# Patient Record
Sex: FEMALE | Race: WHITE | Employment: FULL TIME | URBAN - METROPOLITAN AREA
[De-identification: names, ages, dates, MRNs, and addresses within clinical notes are randomized per-mention and may not be internally consistent; named-entity substitution may affect disease eponyms.]

---

## 2017-06-07 ENCOUNTER — HOSPITAL ENCOUNTER (OUTPATIENT)
Dept: CT IMAGING | Age: 57
Discharge: HOME | End: 2017-06-07
Attending: INTERNAL MEDICINE
Payer: COMMERCIAL

## 2017-06-07 DIAGNOSIS — R91.1 SOLITARY PULMONARY NODULE: ICD-10-CM

## 2017-06-07 PROCEDURE — 71250 CT THORAX DX C-: CPT

## 2019-12-20 ENCOUNTER — CLINICAL SUPPORT (OUTPATIENT)
Dept: CARDIOLOGY CLINIC | Age: 59
End: 2019-12-20

## 2019-12-20 DIAGNOSIS — R00.2 PALPITATIONS: Primary | ICD-10-CM

## 2020-01-23 ENCOUNTER — OFFICE VISIT (OUTPATIENT)
Dept: CARDIOLOGY CLINIC | Age: 60
End: 2020-01-23

## 2020-01-23 VITALS
SYSTOLIC BLOOD PRESSURE: 124 MMHG | WEIGHT: 151.8 LBS | DIASTOLIC BLOOD PRESSURE: 60 MMHG | BODY MASS INDEX: 27.94 KG/M2 | HEIGHT: 62 IN | OXYGEN SATURATION: 98 % | RESPIRATION RATE: 16 BRPM | HEART RATE: 87 BPM

## 2020-01-23 DIAGNOSIS — I07.1 TRICUSPID VALVE INSUFFICIENCY, UNSPECIFIED ETIOLOGY: ICD-10-CM

## 2020-01-23 DIAGNOSIS — R06.09 DOE (DYSPNEA ON EXERTION): ICD-10-CM

## 2020-01-23 DIAGNOSIS — R53.83 FATIGUE, UNSPECIFIED TYPE: ICD-10-CM

## 2020-01-23 DIAGNOSIS — I49.9 CARDIAC ARRHYTHMIA, UNSPECIFIED CARDIAC ARRHYTHMIA TYPE: ICD-10-CM

## 2020-01-23 DIAGNOSIS — R00.2 PALPITATIONS: Primary | ICD-10-CM

## 2020-01-23 DIAGNOSIS — R60.9 SWELLING: ICD-10-CM

## 2020-01-23 DIAGNOSIS — E78.5 DYSLIPIDEMIA: ICD-10-CM

## 2020-01-23 DIAGNOSIS — E11.59 TYPE 2 DIABETES MELLITUS WITH OTHER CIRCULATORY COMPLICATION, WITHOUT LONG-TERM CURRENT USE OF INSULIN (HCC): ICD-10-CM

## 2020-01-23 RX ORDER — ZINC GLUCONATE 10 MG
1 LOZENGE ORAL DAILY
COMMUNITY

## 2020-01-23 RX ORDER — METRONIDAZOLE 7.5 MG/G
CREAM TOPICAL 2 TIMES DAILY
COMMUNITY

## 2020-01-23 RX ORDER — ROSUVASTATIN CALCIUM 10 MG/1
10 TABLET, COATED ORAL
COMMUNITY

## 2020-01-23 RX ORDER — AMLODIPINE BESYLATE 2.5 MG/1
2.5 TABLET ORAL 2 TIMES DAILY
COMMUNITY

## 2020-01-23 RX ORDER — LOSARTAN POTASSIUM 100 MG/1
50 TABLET ORAL 2 TIMES DAILY
COMMUNITY

## 2020-01-23 RX ORDER — EPINEPHRINE 0.3 MG/.3ML
0.3 INJECTION SUBCUTANEOUS
COMMUNITY

## 2020-01-23 RX ORDER — ASPIRIN 81 MG/1
TABLET ORAL DAILY
COMMUNITY

## 2020-01-23 RX ORDER — CHOLECALCIFEROL (VITAMIN D3) 125 MCG
200 CAPSULE ORAL DAILY
COMMUNITY

## 2020-01-23 RX ORDER — DOCUSATE SODIUM 100 MG/1
100 CAPSULE, LIQUID FILLED ORAL DAILY
COMMUNITY

## 2020-01-23 RX ORDER — LEVOTHYROXINE SODIUM 75 UG/1
TABLET ORAL
COMMUNITY

## 2020-01-23 NOTE — PROGRESS NOTES
[facility] [address]: Baldev Mock  [phone]  Requesting/referring provider: Dr. Henri Hagen  Reason for Consult: palpitations    HPI: Edgard Crow, a 61y.o. year-old who presents for evaluation of irregular heartbeats. She was on her way to the gym an she was feeling bad, tired, ill, etc. Went home and then set up to see Dr. Henri Hagen but was feeling better by then. NSR in her office, indeterminate watch recordings on the day she was feeling bad. She noticed that going to the gym she was feeling irregular heartbeats. This week she felt badly on Sat full in the neck etc, Bp was way up so she took an extra losartan. So Started the amlodipine and is taking 50 of losartan BID, amlodipine 2.5mg BID. BP doing better. But with HR going up over 120 she feels badly now. Was on methylphenidate for narcolepsy off that for a month now- had taken it for years without difficulty. Has two leaking valves, mild a few years ago. Done for new murmur. Stress test many years ago for CAST, had false st changes and the cath was normal  Diagnosed with asthma and cath was clean, but that was 20 years ago. Assessment/Plan:  1. Murmur with hx leaky valves- echo to investigate  2. Narcolepsy- off meds today, discussed cardiac effects  3. Palpitations- PAC, PVC on loop but without exercise- now with sx at rate of 120, needs exercise stress testing to look at that  4. DM- on oral agents per Dr. Henri Hagen  5. Dyslipidemia- statin  6. PAC/PVC- loop as above, echo to look at cardiac structure  7. HTN- at goal today, after recent canges  8. Body mass index is 27.76 kg/m². up a few pounds which may be the trigger for the BP trending up. 9. Exertional arrhythmia, fatigue, dyspnea- stress testing with nuclear imaging due to prior false positive ekg and stress echo leading to cath    Fhx + CAD CVA  Soc no to rare etoh  She  has no past medical history on file.     Cardiovascular ROS: positive for - irregular heartbeat  Respiratory ROS: positive for - shortness of breath  Neurological ROS: no TIA or stroke symptoms  All other systems negative except as above. PE  Vitals:    01/23/20 0751   BP: 124/60   Pulse: 87   Resp: 16   SpO2: 98%   Weight: 151 lb 12.8 oz (68.9 kg)   Height: 5' 2\" (1.575 m)    Body mass index is 27.76 kg/m². General appearance - alert, well appearing, and in no distress  Mental status - affect appropriate to mood  Eyes - sclera anicteric, moist mucous membranes  Neck - supple, no significant adenopathy  Lymphatics - no  lymphadenopathy  Chest - clear to auscultation, no wheezes, rales or rhonchi  Heart - normal rate, regular rhythm, normal S1, S2, no murmurs, rubs, clicks or gallops  Abdomen - soft, nontender, nondistended, no masses or organomegaly  Back exam - full range of motion, no tenderness  Neurological - cranial nerves II through XII grossly intact, no focal deficit  Musculoskeletal - no muscular tenderness noted, normal strength  Extremities - peripheral pulses normal, no pedal edema  Skin - normal coloration  no rashes    Recent Labs:  No results found for: CHOL, CHOLX, CHLST, CHOLV, 922854, HDL, HDLP, LDL, LDLC, DLDLP, TGLX, TRIGL, TRIGP, CHHD, CHHDX  No results found for: LOVE, CREAPOC, ACREA, CREA, REFC3, REFC4  No results found for: BUN, BUNPOC, IBUN, MBUNV, BUNV  No results found for: K, KI, PLK, WBK  No results found for: HBA1C, HGBE8, [id number]  No results found for: HGBPOC, HGB, HGBP, HGBEXT  No results found for: PLT, PLTEXT    Reviewed:  No past medical history on file.   Social History     Tobacco Use   Smoking Status Never Smoker   Smokeless Tobacco Never Used     Social History     Substance and Sexual Activity   Alcohol Use Yes    Frequency: 2-4 times a month    Drinks per session: 1 or 2     Allergies   Allergen Reactions    Ace Inhibitors Cough    Bydureon [Exenatide Microspheres] Rash    Codeine Itching    Metformin Diarrhea    Nesina [Alogliptin] Other (comments)     Elevated liver enzymes      Penicillins Hives    Pravastatin Other (comments)     Arthralgia    Tetanus Toxoid, Adsorbed Hives    Trulicity [Dulaglutide] Nausea and Vomiting       Current Outpatient Medications   Medication Sig    amLODIPine (NORVASC) 2.5 mg tablet Take 2.5 mg by mouth two (2) times a day.  aspirin delayed-release 81 mg tablet Take  by mouth daily.  co-enzyme Q-10 (CO Q-10) 100 mg capsule Take 200 mg by mouth daily.  docusate sodium (COLACE) 100 mg capsule Take 100 mg by mouth daily.  EPINEPHrine (EPIPEN) 0.3 mg/0.3 mL injection 0.3 mg by IntraMUSCular route once as needed for Allergic Response.  SITagliptin (JANUVIA) 50 mg tablet Take 50 mg by mouth daily.  levothyroxine (SYNTHROID) 75 mcg tablet Take  by mouth Daily (before breakfast).  linaCLOtide (LINZESS) 72 mcg cap capsule Take  by mouth Daily (before breakfast).  losartan (COZAAR) 100 mg tablet Take 50 mg by mouth two (2) times a day.  magnesium 250 mg tab Take 1 Tab by mouth daily.  metroNIDAZOLE (METROCREAM) 0.75 % topical cream Apply  to affected area two (2) times a day. Use a thin layer to affected areas after washing    rosuvastatin (CRESTOR) 10 mg tablet Take 10 mg by mouth nightly.  liraglutide (VICTOZA) 0.6 mg/0.1 mL (18 mg/3 mL) pnij 1.2 mg by SubCUTAneous route daily. No current facility-administered medications for this visit.         Naldo Powell MD  [facility] [place]  [address] [facility] [address]  [address]

## 2020-02-13 ENCOUNTER — OFFICE VISIT (OUTPATIENT)
Dept: CARDIOLOGY CLINIC | Age: 60
End: 2020-02-13

## 2020-02-13 VITALS
WEIGHT: 151 LBS | HEIGHT: 62 IN | BODY MASS INDEX: 27.79 KG/M2 | DIASTOLIC BLOOD PRESSURE: 70 MMHG | SYSTOLIC BLOOD PRESSURE: 108 MMHG | RESPIRATION RATE: 16 BRPM | HEART RATE: 83 BPM

## 2020-02-13 DIAGNOSIS — R53.83 FATIGUE, UNSPECIFIED TYPE: ICD-10-CM

## 2020-02-13 DIAGNOSIS — R00.2 PALPITATIONS: Primary | ICD-10-CM

## 2020-02-13 DIAGNOSIS — E78.5 DYSLIPIDEMIA: ICD-10-CM

## 2020-02-13 NOTE — PROGRESS NOTES
[facility] [address]: hWit Sotelo  [phone]  Requesting/referring provider: Dr. Amilcar Varner  Reason for Consult: palpitations    HPI: Kanchan Erazo, a 61y.o. year-old who presents for evaluation of irregular heartbeats. She was on her way to the gym an she was feeling bad, tired, ill, etc. Went home and then set up to see Dr. Amilcar Varner but was feeling better by then. NSR in her office, indeterminate watch recordings on the day she was feeling bad. She noticed that going to the gym she was feeling irregular heartbeats. This week she felt badly on Sat full in the neck etc, Bp was way up so she took an extra losartan. So Started the amlodipine and is taking 50 of losartan BID, amlodipine 2.5mg BID. BP doing better. But with HR going up over 120 she feels badly now. Was on methylphenidate for narcolepsy off that for a month now- had taken it for years without difficulty. Has two leaking valves, mild a few years ago. Done for new murmur. Stress test many years ago for CAST, had false st changes and the cath was normal  Diagnosed with asthma and cath was clean, but that was 20 years ago. She is feelign better with the lower BP. No chest pain, taking her Bp twice a day and feeling well. Reviewed her sx today and no chest pain,   reviwed hx CM and no signs of that. Today she presents to follow-up on her stress testing and echo images. Looks great, no ischemia, denihsa function. Assessment/Plan:  1. Murmur with hx leaky valves- echo to investigate looks ok, only minor abnormalities  2. Narcolepsy- off meds today, discussed cardiac effects  3. Palpitations- PAC, PVC on loop but without exercise- pac on stress testing today, isolated  4. DM- on oral agents per Dr. Amilcar Varner  5. Dyslipidemia- statin  6. PAC/PVC- loop as above, echo to look at cardiac structure ok today  7. HTN- at goal today, after recent canges  8. Body mass index is 27.62 kg/m².  up a few pounds which may be the trigger for the BP trending up. 9. Exertional arrhythmia, fatigue, dyspnea- stress testing with nuclear imaging due to prior false positive ekg and stress echo leading to cath in the past.     Nuc no ischemia  Echo nl EF and mild TR PAP 20  Fhx + CAD CVA  Soc no to rare etoh  She  has no past medical history on file. Cardiovascular ROS: positive for - irregular heartbeat  Respiratory ROS: positive for - shortness of breath  Neurological ROS: no TIA or stroke symptoms  All other systems negative except as above. PE  Vitals:    02/13/20 1108   BP: 108/70   Pulse: 83   Resp: 16   Weight: 151 lb (68.5 kg)   Height: 5' 2\" (1.575 m)    Body mass index is 27.62 kg/m². General appearance - alert, well appearing, and in no distress  Mental status - affect appropriate to mood  Eyes - sclera anicteric, moist mucous membranes  Neck - supple, no significant adenopathy  Lymphatics - no  lymphadenopathy  Chest - clear to auscultation, no wheezes, rales or rhonchi  Heart - normal rate, regular rhythm, normal S1, S2, no murmurs, rubs, clicks or gallops  Abdomen - soft, nontender, nondistended, no masses or organomegaly  Back exam - full range of motion, no tenderness  Neurological - cranial nerves II through XII grossly intact, no focal deficit  Musculoskeletal - no muscular tenderness noted, normal strength  Extremities - peripheral pulses normal, no pedal edema  Skin - normal coloration  no rashes    Recent Labs:  No results found for: CHOL, CHOLX, CHLST, CHOLV, [id number], HDL, HDLP, LDL, LDLC, DLDLP, TGLX, TRIGL, TRIGP, CHHD, CHHDX  No results found for: LOVE, CREAPOC, ACREA, CREA, REFC3, REFC4  No results found for: BUN, BUNPOC, IBUN, MBUNV, BUNV  No results found for: K, KI, PLK, WBK  No results found for: HBA1C, HGBE8, [id number], [id number]  No results found for: HGBPOC, HGB, HGBP, HGBEXT, HGBEXT  No results found for: PLT, PLTEXT, PLTEXT    Reviewed:  No past medical history on file.   Social History     Tobacco Use   Smoking Status Never Smoker   Smokeless Tobacco Never Used     Social History     Substance and Sexual Activity   Alcohol Use Yes    Frequency: 2-4 times a month    Drinks per session: 1 or 2     Allergies   Allergen Reactions    Ace Inhibitors Cough    Bydureon [Exenatide Microspheres] Rash    Codeine Itching    Metformin Diarrhea    Nesina [Alogliptin] Other (comments)     Elevated liver enzymes      Penicillins Hives    Pravastatin Other (comments)     Arthralgia    Tetanus Toxoid, Adsorbed Hives    Trulicity [Dulaglutide] Nausea and Vomiting       Current Outpatient Medications   Medication Sig    amLODIPine (NORVASC) 2.5 mg tablet Take 2.5 mg by mouth two (2) times a day.  aspirin delayed-release 81 mg tablet Take  by mouth daily.  co-enzyme Q-10 (CO Q-10) 100 mg capsule Take 200 mg by mouth daily.  docusate sodium (COLACE) 100 mg capsule Take 100 mg by mouth daily.  EPINEPHrine (EPIPEN) 0.3 mg/0.3 mL injection 0.3 mg by IntraMUSCular route once as needed for Allergic Response.  SITagliptin (JANUVIA) 50 mg tablet Take 50 mg by mouth daily.  levothyroxine (SYNTHROID) 75 mcg tablet Take  by mouth Daily (before breakfast).  linaCLOtide (LINZESS) 72 mcg cap capsule Take  by mouth Daily (before breakfast).  losartan (COZAAR) 100 mg tablet Take 50 mg by mouth two (2) times a day.  magnesium 250 mg tab Take 1 Tab by mouth daily.  metroNIDAZOLE (METROCREAM) 0.75 % topical cream Apply  to affected area two (2) times a day. Use a thin layer to affected areas after washing    rosuvastatin (CRESTOR) 10 mg tablet Take 10 mg by mouth nightly.  liraglutide (VICTOZA) 0.6 mg/0.1 mL (18 mg/3 mL) pnij 1.2 mg by SubCUTAneous route daily. No current facility-administered medications for this visit.         Bhaskar Olivas MD  [facility] heart and Vascular [place]  [address] [facility] [address]  [address]

## 2020-02-17 ENCOUNTER — TELEPHONE (OUTPATIENT)
Dept: CARDIOLOGY CLINIC | Age: 60
End: 2020-02-17

## 2020-02-17 NOTE — TELEPHONE ENCOUNTER
Spoke to patient and advised her that her stress test is normal, patient verbalized understanding.     Patient id verified [phone]

## 2021-07-26 ENCOUNTER — TRANSCRIBE ORDER (OUTPATIENT)
Dept: GENERAL RADIOLOGY | Age: 61
End: 2021-07-26

## 2021-07-26 ENCOUNTER — HOSPITAL ENCOUNTER (OUTPATIENT)
Dept: GENERAL RADIOLOGY | Age: 61
Discharge: HOME | End: 2021-07-26
Attending: FAMILY MEDICINE
Payer: COMMERCIAL

## 2021-07-26 DIAGNOSIS — S40.021A: Primary | ICD-10-CM

## 2021-07-26 DIAGNOSIS — S40.021A: ICD-10-CM

## 2021-07-26 PROCEDURE — 73060 X-RAY EXAM OF HUMERUS: CPT

## 2021-10-14 ENCOUNTER — TRANSCRIBE ORDER (OUTPATIENT)
Dept: SCHEDULING | Age: 61
End: 2021-10-14

## 2021-10-14 DIAGNOSIS — N28.1 RENAL CYST: Primary | ICD-10-CM

## 2021-11-03 ENCOUNTER — HOSPITAL ENCOUNTER (OUTPATIENT)
Dept: ULTRASOUND IMAGING | Age: 61
Discharge: HOME | End: 2021-11-03
Attending: UROLOGY
Payer: COMMERCIAL

## 2021-11-03 DIAGNOSIS — N28.1 RENAL CYST: ICD-10-CM

## 2021-11-03 PROCEDURE — 77030014115

## 2021-11-03 PROCEDURE — 2709999900 HC NON-CHARGEABLE SUPPLY

## 2021-11-03 PROCEDURE — 10005 FNA BX W/US GDN 1ST LES: CPT

## 2021-11-03 RX ORDER — LIDOCAINE HYDROCHLORIDE 10 MG/ML
10 INJECTION, SOLUTION EPIDURAL; INFILTRATION; INTRACAUDAL; PERINEURAL
Status: COMPLETED | OUTPATIENT
Start: 2021-11-03 | End: 2021-11-03

## 2021-11-03 RX ADMIN — LIDOCAINE HYDROCHLORIDE 10 ML: 10 INJECTION, SOLUTION EPIDURAL; INFILTRATION; INTRACAUDAL; PERINEURAL at 09:00

## 2022-06-03 ENCOUNTER — TRANSCRIBE ORDER (OUTPATIENT)
Dept: GENERAL RADIOLOGY | Age: 62
End: 2022-06-03

## 2022-06-03 ENCOUNTER — HOSPITAL ENCOUNTER (OUTPATIENT)
Dept: GENERAL RADIOLOGY | Age: 62
Discharge: HOME | End: 2022-06-03
Attending: INTERNAL MEDICINE
Payer: COMMERCIAL

## 2022-06-03 DIAGNOSIS — M25.511 PAIN IN RIGHT SHOULDER: Primary | ICD-10-CM

## 2022-06-03 DIAGNOSIS — M25.511 PAIN IN RIGHT SHOULDER: ICD-10-CM

## 2022-06-03 PROCEDURE — 73030 X-RAY EXAM OF SHOULDER: CPT

## 2023-01-14 ENCOUNTER — HOSPITAL ENCOUNTER (EMERGENCY)
Age: 63
Discharge: HOME | End: 2023-01-14
Attending: EMERGENCY MEDICINE
Payer: COMMERCIAL

## 2023-01-14 ENCOUNTER — APPOINTMENT (OUTPATIENT)
Dept: CT IMAGING | Age: 63
End: 2023-01-14
Attending: EMERGENCY MEDICINE
Payer: COMMERCIAL

## 2023-01-14 VITALS
OXYGEN SATURATION: 99 % | SYSTOLIC BLOOD PRESSURE: 121 MMHG | BODY MASS INDEX: 22.45 KG/M2 | TEMPERATURE: 97.4 F | RESPIRATION RATE: 14 BRPM | DIASTOLIC BLOOD PRESSURE: 78 MMHG | WEIGHT: 122 LBS | HEART RATE: 71 BPM | HEIGHT: 62 IN

## 2023-01-14 DIAGNOSIS — N20.1 LEFT URETERAL STONE: Primary | ICD-10-CM

## 2023-01-14 DIAGNOSIS — N20.0 KIDNEY STONE ON LEFT SIDE: ICD-10-CM

## 2023-01-14 LAB
ALBUMIN SERPL-MCNC: 4.4 G/DL (ref 3.5–5.2)
ALBUMIN/GLOB SERPL: 1.6 (ref 1.1–2.2)
ALP SERPL-CCNC: 85 U/L (ref 35–104)
ALT SERPL-CCNC: 18 U/L (ref 10–35)
ANION GAP SERPL CALC-SCNC: 13 MMOL/L (ref 5–15)
APPEARANCE UR: ABNORMAL
AST SERPL-CCNC: 32 U/L (ref 10–35)
BACTERIA URNS QL MICRO: NEGATIVE /HPF
BASOPHILS # BLD: 0.1 K/UL (ref 0–1)
BASOPHILS NFR BLD: 1 % (ref 0–1)
BILIRUB SERPL-MCNC: 0.4 MG/DL (ref 0.2–1)
BILIRUB UR QL: NEGATIVE
BUN SERPL-MCNC: 14 MG/DL (ref 8–23)
BUN/CREAT SERPL: 13 (ref 12–20)
CALCIUM SERPL-MCNC: 9.4 MG/DL (ref 8.8–10.2)
CHLORIDE SERPL-SCNC: 101 MMOL/L (ref 98–107)
CO2 SERPL-SCNC: 26 MMOL/L (ref 22–29)
COLOR UR: ABNORMAL
CREAT SERPL-MCNC: 1.09 MG/DL (ref 0.5–0.9)
DIFFERENTIAL METHOD BLD: ABNORMAL
EOSINOPHIL # BLD: 0.1 K/UL (ref 0–0.4)
EOSINOPHIL NFR BLD: 1 %
EPITH CASTS URNS QL MICRO: ABNORMAL /LPF
ERYTHROCYTE [DISTWIDTH] IN BLOOD BY AUTOMATED COUNT: 12.3 % (ref 11.5–14.5)
GLOBULIN SER CALC-MCNC: 2.8 G/DL (ref 2–4)
GLUCOSE SERPL-MCNC: 132 MG/DL (ref 65–100)
GLUCOSE UR STRIP.AUTO-MCNC: NEGATIVE MG/DL
HCT VFR BLD AUTO: 38.8 % (ref 35–47)
HGB BLD-MCNC: 13.1 G/DL (ref 11.5–16)
HGB UR QL STRIP: ABNORMAL
IMM GRANULOCYTES # BLD AUTO: 0.1 K/UL (ref 0–0.04)
IMM GRANULOCYTES NFR BLD AUTO: 0 % (ref 0–0.5)
KETONES UR QL STRIP.AUTO: ABNORMAL MG/DL
LEUKOCYTE ESTERASE UR QL STRIP.AUTO: NEGATIVE
LYMPHOCYTES # BLD: 2.4 K/UL (ref 0.8–3.5)
LYMPHOCYTES NFR BLD: 18 % (ref 12–49)
MCH RBC QN AUTO: 32 PG (ref 26–34)
MCHC RBC AUTO-ENTMCNC: 33.8 G/DL (ref 30–36.5)
MCV RBC AUTO: 94.6 FL (ref 80–99)
MONOCYTES # BLD: 0.9 K/UL (ref 0–1)
MONOCYTES NFR BLD: 7 % (ref 5–13)
NEUTS SEG # BLD: 9.8 K/UL (ref 1.8–8)
NEUTS SEG NFR BLD: 73 % (ref 32–75)
NITRITE UR QL STRIP.AUTO: NEGATIVE
NRBC # BLD: 0 K/UL (ref 0–0.01)
NRBC BLD-RTO: 0 PER 100 WBC
PH UR STRIP: 5.5 (ref 5–8)
PLATELET # BLD AUTO: 200 K/UL (ref 150–400)
PMV BLD AUTO: 12.7 FL (ref 8.9–12.9)
POTASSIUM SERPL-SCNC: 4 MMOL/L (ref 3.5–5.1)
PROT SERPL-MCNC: 7.2 G/DL (ref 6.4–8.3)
PROT UR STRIP-MCNC: ABNORMAL MG/DL
RBC # BLD AUTO: 4.1 M/UL (ref 3.8–5.2)
RBC #/AREA URNS HPF: ABNORMAL /HPF
SODIUM SERPL-SCNC: 140 MMOL/L (ref 136–145)
SP GR UR REFRACTOMETRY: 1.02 (ref 1–1.03)
UR CULT HOLD, URHOLD: NORMAL
UROBILINOGEN UR QL STRIP.AUTO: 1 EU/DL (ref 0.2–1)
WBC # BLD AUTO: 13.3 K/UL (ref 3.6–11)
WBC URNS QL MICRO: ABNORMAL /HPF (ref 0–4)

## 2023-01-14 PROCEDURE — 36415 COLL VENOUS BLD VENIPUNCTURE: CPT

## 2023-01-14 PROCEDURE — 74176 CT ABD & PELVIS W/O CONTRAST: CPT

## 2023-01-14 PROCEDURE — 85025 COMPLETE CBC W/AUTO DIFF WBC: CPT

## 2023-01-14 PROCEDURE — 80053 COMPREHEN METABOLIC PANEL: CPT

## 2023-01-14 PROCEDURE — 99284 EMERGENCY DEPT VISIT MOD MDM: CPT

## 2023-01-14 PROCEDURE — 96375 TX/PRO/DX INJ NEW DRUG ADDON: CPT

## 2023-01-14 PROCEDURE — 96374 THER/PROPH/DIAG INJ IV PUSH: CPT

## 2023-01-14 PROCEDURE — 81001 URINALYSIS AUTO W/SCOPE: CPT

## 2023-01-14 PROCEDURE — 74011250636 HC RX REV CODE- 250/636: Performed by: EMERGENCY MEDICINE

## 2023-01-14 RX ORDER — NAPROXEN 500 MG/1
500 TABLET ORAL
Qty: 20 TABLET | Refills: 0 | Status: SHIPPED | OUTPATIENT
Start: 2023-01-14 | End: 2023-01-14 | Stop reason: SDUPTHER

## 2023-01-14 RX ORDER — ONDANSETRON 4 MG/1
4 TABLET, ORALLY DISINTEGRATING ORAL
Qty: 20 TABLET | Refills: 0 | Status: SHIPPED | OUTPATIENT
Start: 2023-01-14 | End: 2023-01-14 | Stop reason: SDUPTHER

## 2023-01-14 RX ORDER — HYDROCODONE BITARTRATE AND ACETAMINOPHEN 5; 325 MG/1; MG/1
1 TABLET ORAL
Qty: 10 TABLET | Refills: 0 | Status: SHIPPED | OUTPATIENT
Start: 2023-01-14 | End: 2023-01-14 | Stop reason: SDUPTHER

## 2023-01-14 RX ORDER — KETOROLAC TROMETHAMINE 30 MG/ML
15 INJECTION, SOLUTION INTRAMUSCULAR; INTRAVENOUS
Status: COMPLETED | OUTPATIENT
Start: 2023-01-14 | End: 2023-01-14

## 2023-01-14 RX ORDER — MORPHINE SULFATE 4 MG/ML
4 INJECTION INTRAVENOUS
Status: COMPLETED | OUTPATIENT
Start: 2023-01-14 | End: 2023-01-14

## 2023-01-14 RX ORDER — ONDANSETRON 4 MG/1
4 TABLET, ORALLY DISINTEGRATING ORAL
Qty: 20 TABLET | Refills: 0 | Status: SHIPPED | OUTPATIENT
Start: 2023-01-14

## 2023-01-14 RX ORDER — ONDANSETRON 2 MG/ML
4 INJECTION INTRAMUSCULAR; INTRAVENOUS
Status: COMPLETED | OUTPATIENT
Start: 2023-01-14 | End: 2023-01-14

## 2023-01-14 RX ORDER — NAPROXEN 500 MG/1
500 TABLET ORAL
Qty: 20 TABLET | Refills: 0 | Status: SHIPPED | OUTPATIENT
Start: 2023-01-14 | End: 2023-01-24

## 2023-01-14 RX ORDER — HYDROCODONE BITARTRATE AND ACETAMINOPHEN 5; 325 MG/1; MG/1
1 TABLET ORAL
Qty: 10 TABLET | Refills: 0 | Status: SHIPPED | OUTPATIENT
Start: 2023-01-14 | End: 2023-01-17

## 2023-01-14 RX ADMIN — MORPHINE SULFATE 4 MG: 4 INJECTION, SOLUTION INTRAMUSCULAR; INTRAVENOUS at 21:24

## 2023-01-14 RX ADMIN — ONDANSETRON 4 MG: 2 INJECTION INTRAMUSCULAR; INTRAVENOUS at 20:01

## 2023-01-14 RX ADMIN — SODIUM CHLORIDE 1000 ML: 9 INJECTION, SOLUTION INTRAVENOUS at 20:03

## 2023-01-14 RX ADMIN — KETOROLAC TROMETHAMINE 15 MG: 30 INJECTION, SOLUTION INTRAMUSCULAR at 20:03

## 2023-01-15 NOTE — ED PROVIDER NOTES
Please note that this dictation was completed with [employer], the computer voice recognition software. Quite often unanticipated grammatical, syntax, homophones, and other interpretive errors are inadvertently transcribed by the computer software. Please disregard these errors. Please excuse any errors that have escaped final proofreading. Patient is a 51-year-old female with history of diabetes, hypertension, hyperlipidemia, presenting to ED for evaluation of left-sided flank pain. She states that this evening she was noticing some urinary frequency and then developed sudden left-sided flank pain. Notes that the pain has been constant but waxes and wanes in terms of severity. Has had vomiting twice when the pain intensifies. Has been told she has kidney stones in the past but has never passed one. She denies fever, bloody emesis, hematuria, stool changes, or any additional medical complaints at this time.        Past Medical History:   Diagnosis Date    Asthma 1/24/2009    DM (diabetes mellitus) ([facility] [address] 1/24/2009    Gestational diabetes 1/24/2009    HTN 1/24/2009    Hypercholesteremia 1/24/2009    Narcolepsy 1/24/2009    TMJ (temporomandibular joint disorder) 1/24/2009       Past Surgical History:   Procedure Laterality Date    HX ORTHOPAEDIC      R knee patellar graft         Family History:   Problem Relation Age of Onset    Cancer Mother         NHL    Diabetes Mother     Hypertension Mother     Stroke Mother     Diabetes Sister     Diabetes Maternal Aunt     Stroke Maternal Aunt     Stroke Other     Diabetes Maternal Aunt        Social History     Socioeconomic History    Marital status:      Spouse name: Not on file    Number of children: Not on file    Years of education: Not on file    Highest education level: Not on file   Occupational History    Not on file   Tobacco Use    Smoking status: Never    Smokeless tobacco: Never   Substance and Sexual Activity    Alcohol use: Yes     Comment: sometimes     Drug use: Never    Sexual activity: Never   Other Topics Concern    Not on file   Social History Narrative    ** Merged History Encounter **          Social Determinants of Health     Financial Resource Strain: Not on file   Food Insecurity: Not on file   Transportation Needs: Not on file   Physical Activity: Not on file   Stress: Not on file   Social Connections: Not on file   Intimate Partner Violence: Not on file   Housing Stability: Not on file         ALLERGIES: Ace inhibitors; Ace inhibitors; Bydureon [exenatide microspheres]; Codeine; Metformin; Nesina [alogliptin]; Pcn [penicillins]; Penicillins; Pravastatin; Tetanus and diphther. tox (pf); Tetanus toxoid, adsorbed; and Trulicity [dulaglutide]    Review of Systems   Constitutional:  Negative for fever. HENT:  Negative for congestion and sore throat. Eyes:  Negative for visual disturbance. Respiratory:  Negative for cough and shortness of breath. Cardiovascular:  Negative for chest pain. Gastrointestinal:  Negative for abdominal pain, diarrhea, nausea and vomiting. Genitourinary:  Positive for flank pain and frequency. Negative for dysuria. Musculoskeletal:  Negative for back pain. Skin:  Negative for color change. Neurological:  Negative for dizziness and headaches. Psychiatric/Behavioral:  Negative for confusion. Vitals:    01/14/23 1839   BP: (!) 170/90   Pulse: 69   Resp: 16   Temp: 97.4 °F (36.3 °C)   SpO2: 99%   Weight: 55.3 kg (122 lb)   Height: 5' 2\" (1.575 m)            Physical Exam  Vitals and nursing note reviewed. Constitutional:       General: She is not in acute distress. Appearance: Normal appearance. She is not ill-appearing. HENT:      Head: Normocephalic and atraumatic. Eyes:      General: Vision grossly intact. Extraocular Movements: Extraocular movements intact.       Conjunctiva/sclera: Conjunctivae normal.   Neck:      Trachea: Phonation normal.   Cardiovascular:      Rate and Rhythm: Normal rate and regular rhythm. Heart sounds: Normal heart sounds. Pulmonary:      Effort: Pulmonary effort is normal.      Breath sounds: Normal breath sounds. Abdominal:      Palpations: Abdomen is soft. Tenderness: There is abdominal tenderness (left flank) in the left lower quadrant. Musculoskeletal:         General: Normal range of motion. Cervical back: Normal range of motion. Skin:     General: Skin is warm and dry. Neurological:      General: No focal deficit present. Mental Status: She is alert and oriented to person, place, and time. Medical Decision Making  Patient is alert, afebrile, vital stable. Tearful, appears uncomfortable. Presents with urinary frequency and left-sided flank pain with onset just prior to arrival. Suspect may have ureteral stone, will obtain labs, UA, CT, pain control. 8:42 PM  Change of shift. Care of patient signed over to Dr. Franck Bills. CT showing 4 mm left ureteral stone. Pt reevaluated, pain improved. Awaiting UA, CMP. Amount and/or Complexity of Data Reviewed  Labs: ordered. Radiology: ordered. Risk  Prescription drug management.            Procedures

## 2023-01-15 NOTE — DISCHARGE INSTRUCTIONS
[facility] Kidney Stone Hotline    The [facility] Hotline is a resource to assist you when experiencing the symptoms of a kidney stone. Call the [facility] hotline at [phone](e). When you call this number, a staff member will coordinate appropriate care by either scheduling you a timely appointment at our office or directing you to immediate care, as needed.

## 2023-04-29 RX ORDER — LOSARTAN POTASSIUM 100 MG/1
50 TABLET ORAL 2 TIMES DAILY
COMMUNITY

## 2024-01-15 ENCOUNTER — HOSPITAL ENCOUNTER (OUTPATIENT)
Facility: HOSPITAL | Age: 64
Discharge: HOME | End: 2024-01-15
Attending: STUDENT IN AN ORGANIZED HEALTH CARE EDUCATION/TRAINING PROGRAM | Admitting: STUDENT IN AN ORGANIZED HEALTH CARE EDUCATION/TRAINING PROGRAM
Payer: COMMERCIAL

## 2024-01-15 VITALS
BODY MASS INDEX: 21.11 KG/M2 | WEIGHT: 114.7 LBS | DIASTOLIC BLOOD PRESSURE: 68 MMHG | SYSTOLIC BLOOD PRESSURE: 131 MMHG | HEIGHT: 62 IN | TEMPERATURE: 98.1 F | HEART RATE: 76 BPM | OXYGEN SATURATION: 98 % | RESPIRATION RATE: 18 BRPM

## 2024-01-15 LAB
APPEARANCE FLD: CLEAR
COLOR FLD: ABNORMAL
NUC CELL # FLD: 1 /CU MM
RBC # FLD: 13 /CU MM
SPECIMEN SOURCE FLD: ABNORMAL

## 2024-01-15 PROCEDURE — 6360000002 HC RX W HCPCS: Performed by: STUDENT IN AN ORGANIZED HEALTH CARE EDUCATION/TRAINING PROGRAM

## 2024-01-15 PROCEDURE — 76942 ECHO GUIDE FOR BIOPSY: CPT

## 2024-01-15 PROCEDURE — 2709999900 IR US UNLISTED

## 2024-01-15 PROCEDURE — 87075 CULTR BACTERIA EXCEPT BLOOD: CPT

## 2024-01-15 PROCEDURE — 2500000003 HC RX 250 WO HCPCS: Performed by: STUDENT IN AN ORGANIZED HEALTH CARE EDUCATION/TRAINING PROGRAM

## 2024-01-15 PROCEDURE — 87070 CULTURE OTHR SPECIMN AEROBIC: CPT

## 2024-01-15 PROCEDURE — 2580000003 HC RX 258: Performed by: STUDENT IN AN ORGANIZED HEALTH CARE EDUCATION/TRAINING PROGRAM

## 2024-01-15 PROCEDURE — 87205 SMEAR GRAM STAIN: CPT

## 2024-01-15 PROCEDURE — 89050 BODY FLUID CELL COUNT: CPT

## 2024-01-15 RX ORDER — MAGNESIUM 30 MG
30 TABLET ORAL 2 TIMES DAILY
COMMUNITY

## 2024-01-15 RX ORDER — SODIUM CHLORIDE 9 MG/ML
INJECTION, SOLUTION INTRAVENOUS CONTINUOUS PRN
Status: COMPLETED | OUTPATIENT
Start: 2024-01-15 | End: 2024-01-15

## 2024-01-15 RX ORDER — FENTANYL CITRATE 50 UG/ML
INJECTION, SOLUTION INTRAMUSCULAR; INTRAVENOUS PRN
Status: COMPLETED | OUTPATIENT
Start: 2024-01-15 | End: 2024-01-15

## 2024-01-15 RX ORDER — MIDAZOLAM HYDROCHLORIDE 2 MG/2ML
INJECTION, SOLUTION INTRAMUSCULAR; INTRAVENOUS PRN
Status: COMPLETED | OUTPATIENT
Start: 2024-01-15 | End: 2024-01-15

## 2024-01-15 RX ORDER — ALCOHOL 1 ML/ML
20 INJECTION, SOLUTION PERCUTANEOUS ONCE
Status: COMPLETED | OUTPATIENT
Start: 2024-01-15 | End: 2024-01-15

## 2024-01-15 RX ADMIN — MIDAZOLAM HYDROCHLORIDE 1 MG: 1 INJECTION, SOLUTION INTRAMUSCULAR; INTRAVENOUS at 08:41

## 2024-01-15 RX ADMIN — ALCOHOL 10 ML: 1 INJECTION, SOLUTION PERCUTANEOUS at 08:47

## 2024-01-15 RX ADMIN — SODIUM CHLORIDE 125 ML/HR: 9 INJECTION, SOLUTION INTRAVENOUS at 08:36

## 2024-01-15 RX ADMIN — FENTANYL CITRATE 50 MCG: 50 INJECTION, SOLUTION INTRAMUSCULAR; INTRAVENOUS at 08:41

## 2024-01-15 ASSESSMENT — PULMONARY FUNCTION TESTS
PIF_VALUE: 0
PIF_VALUE: 30
PIF_VALUE: 0

## 2024-01-15 ASSESSMENT — PAIN DESCRIPTION - ORIENTATION: ORIENTATION: RIGHT

## 2024-01-15 ASSESSMENT — PAIN SCALES - GENERAL: PAINLEVEL_OUTOF10: 2

## 2024-01-15 ASSESSMENT — PAIN DESCRIPTION - LOCATION: LOCATION: FLANK

## 2024-01-15 NOTE — H&P
Radiology History and Physical    Patient: Dari Powell 63 y.o. female       Chief Complaint: No chief complaint on file.      History of Present Illness: 63 year old woman with chronic back and right flank pain, with right renal cyst. The cyst has been aspirated in the past, with resolution of her flank pain. She presents today for aspiration and sclerotherapy of the cyst since it has recurred.    History:    Past Medical History:   Diagnosis Date    Asthma 1/24/2009    DM (diabetes mellitus) ([facility]) 1/24/2009    Gestational diabetes 1/24/2009    Hypercholesteremia 1/24/2009    Narcolepsy 1/24/2009    TMJ (temporomandibular joint disorder) 1/24/2009     Family History   Problem Relation Age of Onset    Diabetes Maternal Aunt     Cancer Mother         NHL    Diabetes Sister     Stroke Mother     Hypertension Mother     Diabetes Mother     Stroke Maternal Aunt     Stroke Other     Diabetes Maternal Aunt      Social History     Socioeconomic History    Marital status:      Spouse name: Not on file    Number of children: Not on file    Years of education: Not on file    Highest education level: Not on file   Occupational History    Not on file   Tobacco Use    Smoking status: Never    Smokeless tobacco: Never   Substance and Sexual Activity    Alcohol use: Yes    Drug use: Never    Sexual activity: Not on file   Other Topics Concern    Not on file   Social History Narrative    ** Merged History Encounter **          Social Determinants of Health     Financial Resource Strain: Not on file   Food Insecurity: Not on file   Transportation Needs: Not on file   Physical Activity: Not on file   Stress: Not on file   Social Connections: Not on file   Intimate Partner Violence: Not on file   Housing Stability: Not on file       Allergies:   Allergies   Allergen Reactions    Ace Inhibitors Cough and Other (See Comments)     cough      Alogliptin Other (See Comments)     Elevated liver enzymes    Codeine Itching

## 2024-01-15 NOTE — DISCHARGE INSTRUCTIONS
Sclerotherapy: What to Expect at Home  Your Recovery  The doctor injected a special chemical (sclerosant) into a cyst. This chemical damaged and scarred the inside lining of the cyst. This caused the cyst to close.  After sclerotherapy, your recovery will be short. You should be able to walk right away. But avoid strenuous activity until your doctor says it's okay.  This care sheet gives you a general idea about how long it will take for you to recover. But each person recovers at a different pace. Follow the steps below to feel better as quickly as possible.  How can you care for yourself at home?  Activity    Rest when you feel tired. Getting enough sleep will help you recover.     Try to do low-impact exercise each day. This includes walking and bicycle riding.     Avoid strenuous activities, such as jogging, weight lifting, or aerobic exercise, until your doctor says it is okay.        Medicine    Your doctor will tell you if and when you can restart your medicines. Your doctor will also give you instructions about taking any new medicines.     If you stopped taking aspirin or some other blood thinner, your doctor will tell you when to start taking it again.   Other instructions        You may shower after 24 hours after the procedure. Don't swim or take a bath for one week.     Keep the puncture site covered while the area heals.    Follow-up care is a key part of your treatment and safety. Be sure to make and go to all appointments, and call your doctor if you are having problems. It's also a good idea to know your test results and keep a list of the medicines you take.  When should you call for help?   Call 911 anytime you think you may need emergency care. For example, call if:    You passed out (lost consciousness).     You have severe trouble breathing.     You have sudden chest pain and shortness of breath, or you cough up blood.   Call your doctor now or seek immediate medical care if:    You get

## 2024-01-15 NOTE — PROGRESS NOTES
Pt arrives ambulatory to angio department accompanied by  for renal sclerotherapy procedure. All assessments completed and consent was reviewed.  Education given was regarding procedure, moderate sedation, post-procedure care and  management/follow-up. Opportunity for questions was provided and all questions and concerns were addressed.

## 2024-01-15 NOTE — PROCEDURES
Interventional Radiology  Procedure Note        1/15/2024 9:59 AM    Patient: Dari Powell     Informed consent obtained    Diagnosis: right renal cyst    Procedure(s): US guided aspiration and alcohol sclerotherapy of right renal cyst. 16cc clear serous fluid was aspirated and sent for lab analysis. 10cc alcohol was administered into the cyst, and left for 1hr dwell time. The alcohol was then aspirated and the catheter was removed.    Estimated blood loss: less than 5cc    Anesthesia: Moderate sedation    Specimens removed:  16cc serous fluid    Complications: None    Implants: None    Primary Physician: Francois Kunz MD    Recommendations: N/A    Full dictated report to follow    Francois Kunz MD  Interventional Radiology  [facility]  9:59 AM, 1/15/2024

## 2024-01-19 LAB
BACTERIA SPEC CULT: NORMAL
GRAM STN SPEC: NORMAL
GRAM STN SPEC: NORMAL
SERVICE CMNT-IMP: NORMAL
SERVICE CMNT-IMP: NORMAL